# Patient Record
(demographics unavailable — no encounter records)

---

## 2024-10-09 NOTE — PHYSICAL EXAM
[FreeTextEntry1] : The patient is alert and oriented x3, naming intact x3, repetition normal, follows three-step commands, and is able to participate fully in the history taking. Speech is normal with no evidence of dysarthria. Memory is intact: Immediate recall 3 out of 3, short-term 3 out of 3, remote memory intact Cranial nerves II through XII intact Motor exam: Upper and lower extremities 5 out of 5 power, normal tone. No abnormal movements noted. Sensory exam: Intact to light touch and pinprick. Romberg negative. Coordination and vestibular exam: Finger to nose intact, no evidence of truncal or appendicular ataxia. No evidence of nystagmus. No vestibular symptoms elicited with head turning during ambulation. Gait: Normal stance and gait. No evidence of loss of balance Reflexes: One to 2+ in upper and lower extremities. No pathological reflexes. Downgoing toes.

## 2024-10-09 NOTE — HISTORY OF PRESENT ILLNESS
[FreeTextEntry1] : Interval History 10/9/24:  Since last visit, patient reports feeling off balance and not walking right and was later admitted to [facility] right thalamic stroke 2/2 ICAD. She was in the hospital for 4 days and then was in rehab for 3 weeks. She has continued to get therapy at home. She also saw a neurosurgeon, Dr. Newberry who recommends a repeat MRI w/wo in 6 months. Discharge medications including DAPT x90 days and Atorvastatin 80 mg. She reports frequent UTIs for which she was given vaginal estrogen cream. She reports no falls and has been using a cane to walk. She has been getting PT at home as well. She feels like her walking and handwriting is better post stroke. She is in the process of applying for [employer] and has been using home health aids to assist with laundry, etc.  Hospital Records: Discharge lab work with LDL 97, PRU 89 and A1c 7.3%. EF 56-60%, LA normal HCT: No visible acute intracranial findings. Small chronic occipital PCA territory infarct. Mild chronic microvascular disease. Possible intraosseous epidermoid or possibly dermoid cyst.  MRI Brain: Acute lacunar infarct in the right thalamus extending into the subthalamic area. Chronic hemorrhagic infarct in the left occipital PCA territory. Chronic lacunar infarct in the left frontal internal watershed involving the corona radiata. Calcific atherosclerotic disease of the proximal intradural left vertebral artery. Punctate chronic hemorrhage in the left dorsal cerebellum.  MRA H/N: There is focal slightly ectatic appearance of the left paraophthalmic ICA, possibly on the basis of a vessel infundibulum or atherosclerotic disease. There is focal severe stenosis of the right PCA P2 segment, presumably atherosclerotic in origin. The proximal anterior, middle and posterior cerebral arteries are otherwise patent bilaterally. No evidence of vascular occlusion, aneurysm or vascular malformation. Carotid US: No carotid stenosis bilaterally. ______________ Interval History 7/24/24:  Xanax this morning, only on Xanax PRN right sided head pain, comes and goes, slowly getting worse for the past 3 weeks, no known triggers, no tenderness to palpation  stopped Gabapentin but still taking Tegretol 400 mg BID trying Trazadone 200 mg daily at bedtime haven't been able to get out of the house for 2 months no SOB when walking to store Tylenol not helpful wants MRI to make sure nothing is wrong legs feel weak and body feels off, everything hurts saw Dr. Cooper (PCP) on Monday, EKG, chest x-ray and blood work negative taking off Atenolol did improve SOB laying in bed all day which makes her feel better ________ Interval History 4/10/24:  Since last visit, she reports that her trigeminal neuralgia is well controlled (Carbamazepine and Gabapentin PRN). She was seen by her psychiatrist yesterday who is transitioning her from Lexapro to Zoloft 25 mg. She took her first dose of Zoloft today and is scheduled to follow up with psych at the beginning of May. She is also slowly being weaned off her Ativan as well. She feels "off" since taking the Zoloft. Her legs continue to feel achey when walking which is consistent since last visit. She also stopped her Atenolol 3 weeks ago as well.   ________ Interval history: Patient's pain is much better with much less pain in the face. Takes carbamazepine standing and gabapentin prn and that seems to work well.  On lexapro 7.5 mg and ativan 1 mg. Still does not feel well.    Interval History 11/8/23: CC: Trigeminal Neuralgia  Since last visit, she continues to endorse similar trigeminal neuralgia pain compared to last visit. The pain is only on the left side and worsens at night. She is currently taking Carbamazepine  mg (2 tablets BID) and Gabapentin 100 mg, 1 tablet in the AM and 2 tablets at bedtime. She took 3 tablets at bedtime once and she felt like it made her too sleepy.  She is also concerned as she has had difficulty walking and breathing since her RSV vaccination 3 weeks ago. The symptoms occurred the day after she got the vaccine. Her primary doctor recommended that she have lab work taken to evaluate her GBS. She felt her symptoms improved last night and into this morning and have since returned. She feels off balance at times and needs to stop and catch her breath when walking. It is difficult for her to breathe deeply. She endorses getting intermittent headaches as well and difficulty with swallowing hot temperature foods. She is currently on antibiotics for an UTI since Friday. Laying down makes her feel better. No prior vaccination reactions. No fever or night sweats. No known sick contacts. She is scheduled to see her PCP, Dr. Cooper on Monday.    Confirmed that she takes Ativan 1 mg, 3 times per day as needed.  ____________________ Interval history 9/5/23:   She continues to report worsening TN symptoms at night. She would like her medications adjusted to improve her pain. She would like her most recent imaging reviewed from [place] as she was told there were some abnormalities.   _____________________ CC: Trigeminal neuralgia follow up   Interval hx 8/11/23: Since last visit, patient c/o increased pain with her daily Carbamazepine. She has trailed taking a 3rd pill every other day which didn't' show improvement as well as Lacosamide 50mg once daily with no improvement.   ________ had a flare up of her trigeminal pain in the last week. She is very stressed at the situation with her wife and her dementia and her being in the nursing home. She is taking clonazepam bid and that is not helping. in addition she is on tapering doses of Lexapro.  We went up last week to 2 tablets carbamazepine twice a day and it did not work. we then tried three tablets BID and patient felt dizzy and sleepy so she has gone back to 2 twice a day and feels at baseline but is having a lot of pain still.  Pain is worse when she is lying down.

## 2024-10-09 NOTE — ASSESSMENT
[FreeTextEntry1] : Plan: -Continue DAPT x90 days then Plavix monotherapy for secondary stroke prevention -Transition to Rosuvastatin 40 mg for LDL goal <70 -Continue aggressive vascular risk factor control with PCP, BP goal <130/80 -F/u with [facility] for cyst surveillance as was seen on previously MRI -Okay to use vaginal cream to hopefully decrease UTI frequency; will check estrogen level after using cream for 1 week -Start Zoloft 25 mg for mood with plan to increase at f/u -Continue Tegretol 400 mg BID for TN -Counselled on healthy eating (DASH/Mediterranean diet, limiting red meats and fried foods) -Counselled on importance of regular exercise and remaining active -Counselled on f/u with PCP regarding regular health maintenance and prevention, including routine screening -Counselled on signs of stroke BEFAST and to call 911 with any new or worsening neurological symptoms -RTO in 1 month

## 2024-10-11 NOTE — PHYSICAL EXAM
[Alert] : alert [No Acute Distress] : no acute distress [EOMI] : extra ocular movement intact [Normal Hearing] : hearing was normal [No Respiratory Distress] : no respiratory distress [No Accessory Muscle Use] : no accessory muscle use [Normal Rate and Effort] : normal respiratory rate and effort [Not Distended] : not distended [Spine Straight] : spine straight [No Stigmata of Cushings Syndrome] : no stigmata of Cushings Syndrome [Oriented x3] : oriented to person, place, and time [Normal Insight/Judgement] : insight and judgment were intact [de-identified] : Slowed gait, ambulating with cane

## 2024-10-11 NOTE — PHYSICAL EXAM
[Alert] : alert [No Acute Distress] : no acute distress [EOMI] : extra ocular movement intact [Normal Hearing] : hearing was normal [No Respiratory Distress] : no respiratory distress [No Accessory Muscle Use] : no accessory muscle use [Normal Rate and Effort] : normal respiratory rate and effort [Not Distended] : not distended [Spine Straight] : spine straight [No Stigmata of Cushings Syndrome] : no stigmata of Cushings Syndrome [Oriented x3] : oriented to person, place, and time [Normal Insight/Judgement] : insight and judgment were intact [de-identified] : Slowed gait, ambulating with cane

## 2024-10-11 NOTE — ASSESSMENT
[FreeTextEntry1] : Type 2 DM, not well controlled w/complications including CAD Most recent A1c 7.7% (Sept 4, 2024) Current regimen: Mounjaro 7.5mg weekly  Presents for CGM education. She was initially apprehensive about CGM reader use, finding it overwhelming especially in light of recent CVA and her recent health issues and thinking she may take it off immediately. After discussing what the day to day use of the CGM/reader may look like (having ready access to glucose readings via scan), she seemed to warm up to it and agreed to try it for a couple days to see how it would go.  Plan -- We reviewed starting CGM - Freestyle Hernandez 2 sensor/reader. Sensor was applied to left arm, and Petty was able to successfully pair the sensor with the reader. -- Discussed goal BG ranges, glucose alarms, meaning of trend arrows, and importance of confirming BG with fingerstick if hypoglycemic or not matching symptoms. -- Reviewed hypoglycemia protocol. -- Continue current Diabetes medication regimen -- will discuss home BG readings with Dr. Prescott. -- Reminded her to bring reader to future appointments for review of glucose data. -- She can contact the office with any questions. -- Advised to call neurologist (saw yesterday) given her symptoms of feeling a little unsteady on her feet today. -- Follow up as planned with Dr. Prescott.

## 2024-10-11 NOTE — ADDENDUM
[FreeTextEntry1] : 10/11/24, MS: Called Petty after speaking with Dr. Prescott about her FBS, which were generally 140-190. Plan to increase to Mounjaro 10mg once a week. Petty is in agreement with plan. Prescription updated.

## 2024-10-11 NOTE — PHYSICAL EXAM
[Alert] : alert [No Acute Distress] : no acute distress [EOMI] : extra ocular movement intact [Normal Hearing] : hearing was normal [No Respiratory Distress] : no respiratory distress [No Accessory Muscle Use] : no accessory muscle use [Normal Rate and Effort] : normal respiratory rate and effort [Not Distended] : not distended [Spine Straight] : spine straight [No Stigmata of Cushings Syndrome] : no stigmata of Cushings Syndrome [Oriented x3] : oriented to person, place, and time [Normal Insight/Judgement] : insight and judgment were intact [de-identified] : Slowed gait, ambulating with cane

## 2024-10-11 NOTE — PHYSICAL EXAM
[Alert] : alert [No Acute Distress] : no acute distress [EOMI] : extra ocular movement intact [Normal Hearing] : hearing was normal [No Respiratory Distress] : no respiratory distress [No Accessory Muscle Use] : no accessory muscle use [Normal Rate and Effort] : normal respiratory rate and effort [Not Distended] : not distended [Spine Straight] : spine straight [No Stigmata of Cushings Syndrome] : no stigmata of Cushings Syndrome [Oriented x3] : oriented to person, place, and time [Normal Insight/Judgement] : insight and judgment were intact [de-identified] : Slowed gait, ambulating with cane

## 2024-10-11 NOTE — HISTORY OF PRESENT ILLNESS
[FreeTextEntry1] : Petty Chowdhury is an 86 year old female who presents for DM follow up/CGM education.  Patient of Dr. Prescott, last seen September 4, 2024  PMHx: ASCVD, obesity, HTN, COPD, HLD, follicular lymphoma, CHRIS, sarcoma Allergies: Dilaudid, metformin  Presents with all supplies -- purchased and paid in cash as insurance did not cover Feeing apprehensive about starting CGM use. Says she is feeling a little unsteady on her feet today. She had a CVA in August and saw her neurologist yesterday.  FS in office: 127  Current DM regimen: Mounjaro 7.5mg weekly Brought a recent log with fasting blood glucose levels; typically 140-190, with occasional reading in 200s (though none >250) She is worried about these glucose readings and is hoping to speak with Dr. Prescott about them further. A1c in September was 7.7%.  Education session regarding the initiation of use of Freestyle Hernandez 2 continuous glucose monitor was conducted.  We reviewed technique for sensor self-application including steps of hand hygiene, cleaning skin with alcohol pad, appropriate site of application, sensor application according to the directions, and pairing the sensor with the reader/phone application.  We also reviewed general concepts of CGM monitoring such as trends, arrows indicating glucose rising/falling, and when to check with fingerstick (when number does not match how they are feeling and in case of hypoglycemia).  We also discussed CGM goals such as Time in Range (BG- 70-180mg/dL) >70% of the time, Time Below Range (<70 mg/dL <4% of time, <54mg/dL <1% of time), Time Above Range (181-250mg/dL <25%, and >251mg/dL <5%), as well as goal fasting BS/PPBS.  We reviewed the high and low glucose alarms.  Discussed hypoglycemia protocol and Rule of 15.

## 2024-11-13 NOTE — HISTORY OF PRESENT ILLNESS
[FreeTextEntry1] : Interval History 11/13/24:  Since last visit, patient no new stroke-like symptoms. She is tolerating her Aspirin, Plavix and Rosuvastatin without bleeding, bruising or myalgias. BP today 118/75, SBPs in 120-140s at home with new blood pressure machine. She reports still not feeling 100% but has loved going to PT twice per week. She still feels wobbly on the left side. She is not taking her Xanax as often. She feels like her mood is stable. She is using Gabapentin 300 mg to help her sleep at night since Trazadone was not beneficial. She denies any TN symptoms. She has been using [facility] to get to her appointments. She reports no recent blood work. She is concerned about hair loss with her cholesterol medication.   Interval History 10/9/24:  Since last visit, patient reports feeling off balance and not walking right and was later admitted to [facility] right thalamic stroke 2/2 ICA. She was in the hospital for 4 days and then was in rehab for 3 weeks. She has continued to get therapy at home. She also saw a neurosurgeon, Dr. Newberry who recommends a repeat MRI w/wo in 6 months. Discharge medications including DAPT x90 days and Atorvastatin 80 mg. She reports frequent UTIs for which she was given vaginal estrogen cream. She reports no falls and has been using a cane to walk. She has been getting PT at home as well. She feels like her walking and handwriting is better post stroke. She is in the process of applying for [employer] and has been using home health aids to assist with laundry, etc.  Hospital Records: Discharge lab work with LDL 97, PRU 89 and A1c 7.3%. EF 56-60%, LA normal HCT: No visible acute intracranial findings. Small chronic occipital PCA territory infarct. Mild chronic microvascular disease. Possible intraosseous epidermoid or possibly dermoid cyst.  MRI Brain: Acute lacunar infarct in the right thalamus extending into the subthalamic area. Chronic hemorrhagic infarct in the left occipital PCA territory. Chronic lacunar infarct in the left frontal internal watershed involving the corona radiata. Calcific atherosclerotic disease of the proximal intradural left vertebral artery. Punctate chronic hemorrhage in the left dorsal cerebellum.  MRA H/N: There is focal slightly ectatic appearance of the left paraophthalmic ICA, possibly on the basis of a vessel infundibulum or atherosclerotic disease. There is focal severe stenosis of the right PCA P2 segment, presumably atherosclerotic in origin. The proximal anterior, middle and posterior cerebral arteries are otherwise patent bilaterally. No evidence of vascular occlusion, aneurysm or vascular malformation. Carotid US: No carotid stenosis bilaterally. ______________ Interval History 7/24/24:  Xanax this morning, only on Xanax PRN right sided head pain, comes and goes, slowly getting worse for the past 3 weeks, no known triggers, no tenderness to palpation  stopped Gabapentin but still taking Tegretol 400 mg BID trying Trazadone 200 mg daily at bedtime haven't been able to get out of the house for 2 months no SOB when walking to store Tylenol not helpful wants MRI to make sure nothing is wrong legs feel weak and body feels off, everything hurts saw Dr. Cooper (PCP) on Monday, EKG, chest x-ray and blood work negative taking off Atenolol did improve SOB laying in bed all day which makes her feel better ________ Interval History 4/10/24:  Since last visit, she reports that her trigeminal neuralgia is well controlled (Carbamazepine and Gabapentin PRN). She was seen by her psychiatrist yesterday who is transitioning her from Lexapro to Zoloft 25 mg. She took her first dose of Zoloft today and is scheduled to follow up with psych at the beginning of May. She is also slowly being weaned off her Ativan as well. She feels "off" since taking the Zoloft. Her legs continue to feel achey when walking which is consistent since last visit. She also stopped her Atenolol 3 weeks ago as well.   ________ Interval history: Patient's pain is much better with much less pain in the face. Takes carbamazepine standing and gabapentin prn and that seems to work well.  On lexapro 7.5 mg and ativan 1 mg. Still does not feel well.    Interval History 11/8/23: CC: Trigeminal Neuralgia  Since last visit, she continues to endorse similar trigeminal neuralgia pain compared to last visit. The pain is only on the left side and worsens at night. She is currently taking Carbamazepine  mg (2 tablets BID) and Gabapentin 100 mg, 1 tablet in the AM and 2 tablets at bedtime. She took 3 tablets at bedtime once and she felt like it made her too sleepy.  She is also concerned as she has had difficulty walking and breathing since her RSV vaccination 3 weeks ago. The symptoms occurred the day after she got the vaccine. Her primary doctor recommended that she have lab work taken to evaluate her GBS. She felt her symptoms improved last night and into this morning and have since returned. She feels off balance at times and needs to stop and catch her breath when walking. It is difficult for her to breathe deeply. She endorses getting intermittent headaches as well and difficulty with swallowing hot temperature foods. She is currently on antibiotics for an UTI since Friday. Laying down makes her feel better. No prior vaccination reactions. No fever or night sweats. No known sick contacts. She is scheduled to see her PCP, Dr. Cooper on Monday.    Confirmed that she takes Ativan 1 mg, 3 times per day as needed.  ____________________ Interval history 9/5/23:   She continues to report worsening TN symptoms at night. She would like her medications adjusted to improve her pain. She would like her most recent imaging reviewed from [place] as she was told there were some abnormalities.   _____________________ CC: Trigeminal neuralgia follow up   Interval hx 8/11/23: Since last visit, patient c/o increased pain with her daily Carbamazepine. She has trailed taking a 3rd pill every other day which didn't' show improvement as well as Lacosamide 50mg once daily with no improvement.   ________ had a flare up of her trigeminal pain in the last week. She is very stressed at the situation with her wife and her dementia and her being in the nursing home. She is taking clonazepam bid and that is not helping. in addition she is on tapering doses of Lexapro.  We went up last week to 2 tablets carbamazepine twice a day and it did not work. we then tried three tablets BID and patient felt dizzy and sleepy so she has gone back to 2 twice a day and feels at baseline but is having a lot of pain still.  Pain is worse when she is lying down.

## 2024-11-13 NOTE — ASSESSMENT
[FreeTextEntry1] : Plan: -Compared MRI from 2023 to 2024; epidermal cyst stable without edema - encouraged to follow up NSGY for routine checks -Continue Plavix and Rosuvastatin for secondary stroke prevention; stop Aspirin  -If hair loss worsens with Rosuvastatin then will referral to Dr. Restrepo for cholesterol management -Continue aggressive vascular risk factor control with PCP, BP goal <130/80 and LDL goal <50 -Increase Zoloft to 50 mg for mood management -Encouraged increased socialization on the weekends at [facility] -Continue Tegretol 400 mg BID for TN -Continue Gabapentin 300 mg daily at bedtime for sleep assistance; emphasized sleep hygiene practices -Continue PT as tolerated -Stroke labs today including PRU, CMP, lipid profile and Lipoprotein A -Counselled on healthy eating (DASH/Mediterranean diet, limiting red meats and fried foods) -Counselled on importance of regular exercise and remaining active -Counselled on f/u with PCP regarding regular health maintenance and prevention, including routine screening -Counselled on signs of stroke BEFAST and to call 911 with any new or worsening neurological symptoms -Plan of care discussed with Dr. Cooper (PCP) via telephone call -RTO in 3 months

## 2024-12-31 NOTE — ASSESSMENT
[FreeTextEntry1] : 1) DM2: now unontrolled, A1C on 5/2024 above target, now at 7.7% Natural hx of the disease and importance of treatment targets discussed at length, she verbalized understanding. ADA diet and importance of exercise discussed at length. Plan is to stop trulicity and switch GLP-1 agonist today to mounjaro 7.5 mg weekly. Refer to Nutritionist today. We sarika check microalbumin, lipids and labs on the NV. Discuss vaccines and podiatry/opthalmology referrals on NV (eye exam referral provided today. we will consider SGLT-2 inh initiation on the NV if improved diet does not reduce A1C. advised on importance of FSG monitoring post chemotherapy and to contact us if hyperglycemia occurs.    2) Weight gain: complicated by DM2. Discussed medical strategies. Pt would like to try lifestyle modifications and GLP-1 agonist therapy at this time. Reassess on the NV for at least ~5% TBW loss. r/o pituitary dysfunction as cause of abnormal weight gain.    3) Essential HTN: Pt is at goal BP and on an ACE inh. Reassess microalbumin prior to the NV.    4) Dyslipidemia: Pt is not on a moderate intensity statin. Start Atorvastatin 40 mg QDaily after we REassess lipids on the next visit. LDL target <100. [Carbohydrate Consistent Diet] : carbohydrate consistent diet [Long Term Vascular Complications] : long term vascular complications of diabetes [Importance of Diet and Exercise] : importance of diet and exercise to improve glycemic control, achieve weight loss and improve cardiovascular health [Self Monitoring of Blood Glucose] : self monitoring of blood glucose [Incretin Mimetic Therapy] : Risks and benefits of incretin mimetic therapy were discussed with the patient including nauseau, pancreatitis and potential risk of medullary thyroid cancer

## 2024-12-31 NOTE — HISTORY OF PRESENT ILLNESS
[FreeTextEntry1] : here for f/u evaluation and management of DM2 generally feels well and endorses no acute complaints. reports diagnosis ~20+ y/a. no past hospitalizations. no known micro/macrovascular complications. long standing use of januvia. does not recall other meds. reports recent change in appetite, eating more carbs recently. weight gain has occurred. no med changes other than drop on januvia to 50 mg PO QD.  12/2024 : Here for /fu, generally feels well and endorses no acute complaints. no interval hosp.  reports no nausea or vomiting. not on steroids at this time. Has not been monitoring FSG. Today reports full tolerance of GLP-1 agonist at 5 mg dose as prescribed by FABRIZIO GOSS, denies any GI s/e. appetite control  has improved and weight loss has resumed, FSG have improved at home as well, consistently <140, no hypoglycemia.  She otherwise denies any f/c, CP, SOB, palpitations, tremors, depressed mood, anxiety, palpitations, n/v, stool/urinary abn, skin/weight changes, heat/cold intolerance, HAs, breast/nipple changes, polyuria/polydipsia/nocturia or other complaints. Reports recent metformin exposure, not tolerated 2/2 palpitations and heartburn.

## 2025-01-07 NOTE — ASSESSMENT
[FreeTextEntry1] : 85 y/o female with PMHx of HTN, CAD (x 2 stents), DM2, lung CA s/p left upper lobectomy in 2008 at [facility] (no chemo/radiation), right thalamic stroke 2/2 ICAD 2024, hx of sarcoma of stomach s/p resection 2023? 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  On imaging review, right parietal interosseous mass increased in size on August 2024 MRI compared to 2014 imaging, but grossly stable from 2022 imaging.  Without complaints for today's visit.   Will re-image with repeat MRI Brain @ 6 months (Feb 2025) with apt with Dr. D'Amico after MRI for review.   A total of 10 minutes was spent reviewing the patient's history, any available imaging, and verbal examination of the patient. The patient's questions were answered.  The patient demonstrated an excellent understanding of todays discussion and next steps in treatment plan.

## 2025-01-07 NOTE — REASON FOR VISIT
[New Patient Visit] : a new patient visit [Home] : at home, [unfilled] , at the time of the visit. [Medical Office: ([facility])___] : at the medical office located in  [Verbal consent obtained from patient] : the patient, [unfilled]

## 2025-01-07 NOTE — HISTORY OF PRESENT ILLNESS
[de-identified] : 87 y/o female with PMHx of HTN, CAD (x 2 stents), DM2, lung CA s/p left upper lobectomy in 2008 at [facility] (no chemo/radiation), right thalamic stroke 2/2 ICAD 2024, hx of sarcoma of stomach s/p resection 2023? 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   Pt presents today to establish neurosurgical care, referred by neurology NP Shereen Diaz. - She was previously following with intermittent imaging of the brain to follow right parietal interosseous mass. Patient unsure why she had MRI done in 2014 (@ [facility]) that dx lesion. Per note/ record review, was for diplopia workup.  - 2/6/23 MRI brain w/wo report with right parietal intramedullary bone mass, sub-centimeter anterior right temporal dural based enhancing focus, may represent a tiny meningioma.  - Repeat MRI brain done 8/1/23 @ [facility]. Pt believes was done during melanoma imaging workup but not entirely sure. MRI showed right parietal interosseous mass and lesion of left occipital pole and left cerebellum.  - Most recently had August MRI brain done after stroke at [facility].  - Without any complaints for today's visit. Denies headaches, dizziness, facial pain, weakness.   Neurology: Shereen Calderon  Endo: Grady Prescott

## 2025-01-30 NOTE — HISTORY OF PRESENT ILLNESS
[FreeTextEntry1] : 2025 -- 86 F  with hx recurrent UTIs. Patient doing well with estrogen cream. She has not had any recent UTIs. Denies gross hematuria or dysuria. Urinary leakage resolved as per pt. Reports rare urinary incontinence every 3 months when "standing at the sink [doing dishes]". She wears pads for protection- mostly dry.     24-- 86 F  with hx recurrent UTIs. She had a recent stroke and was in rehab. Patient reports that her urinary leakage at night has resolved after the last course of abx she took. She presents today for urodynamics.   2024 -- 86 F  with hx recurrent UTIs. Reports bothersome urinary leakage. Patient verbalizes that she had a recent stroke and was in rehab- just discharged 4 days ago. Reports that she was treated with 7-day keflex by Kenny for recent UTI while in rehab. Her dysuria improved however her leakage is still bothersome.  2024 -- Pt is 84 yo F  with hx recurrent UTIs being treated by Dr. Cooper with oral abx (she has done well with cipro in the past). She reports 3-4 UTIs over the past year. She has not had any recent UTIs in the past 4 months. Denies gross hematuria or dysuria. Pt unsure of what triggers her UTIs.  She has some urinary dribbling once a month. She wears pads when she is going out. Denies sensation of incomplete emptying and prolapse. Denies PMH and FHx renal stones. PSH hysterectomy "long time ago" for ?pre-ca with no radiation/chemotherapy as per pt.  Of note, pt does not prefer additional procedures/tests. She has done well with cipro in the past.  Udip: 2024 ---small blood PVR: 5cc (done to rule out incomplete bladder emptying)  PMH: anxiety/depression, DM, HTN PSH: hysterectomy, cholecystectomy, **attached intake FH: no  malignancies SocHx: non-smoker, no alcohol Meds: Losartan, Amlodipine, Atenolol, Trulicity, Lexapro, Gabapentin Allergies: NKDA

## 2025-01-30 NOTE — ADDENDUM
[FreeTextEntry1] : A portion of this note was written by [Alejandro Zaman] on 09/19/2024 acting as a scribe for Dr. Cohen.   I have personally reviewed the chart and agree that the record accurately reflects my personal performance of the history, physical exam, assessment, and plan.

## 2025-01-30 NOTE — HISTORY OF PRESENT ILLNESS
[FreeTextEntry1] : 2025 -- 86 F  with hx recurrent UTIs. Patient doing well with estrogen cream. She has not had any recent UTIs. Denies gross hematuria or dysuria. Urinary leakage resolved as per pt. Reports rare urinary incontinence every 3 months when "standing at the sink [doing dishes]". She wears pads for protection- mostly dry.     24-- 86 F  with hx recurrent UTIs. She had a recent stroke and was in rehab. Patient reports that her urinary leakage at night has resolved after the last course of abx she took. She presents today for urodynamics.   2024 -- 86 F  with hx recurrent UTIs. Reports bothersome urinary leakage. Patient verbalizes that she had a recent stroke and was in rehab- just discharged 4 days ago. Reports that she was treated with 7-day keflex by Kenny for recent UTI while in rehab. Her dysuria improved however her leakage is still bothersome.  2024 -- Pt is 86 yo F  with hx recurrent UTIs being treated by Dr. Cooper with oral abx (she has done well with cipro in the past). She reports 3-4 UTIs over the past year. She has not had any recent UTIs in the past 4 months. Denies gross hematuria or dysuria. Pt unsure of what triggers her UTIs.  She has some urinary dribbling once a month. She wears pads when she is going out. Denies sensation of incomplete emptying and prolapse. Denies PMH and FHx renal stones. PSH hysterectomy "long time ago" for ?pre-ca with no radiation/chemotherapy as per pt.  Of note, pt does not prefer additional procedures/tests. She has done well with cipro in the past.  Udip: 2024 ---small blood PVR: 5cc (done to rule out incomplete bladder emptying)  PMH: anxiety/depression, DM, HTN PSH: hysterectomy, cholecystectomy, **attached intake FH: no  malignancies SocHx: non-smoker, no alcohol Meds: Losartan, Amlodipine, Atenolol, Trulicity, Lexapro, Gabapentin Allergies: NKDA

## 2025-01-30 NOTE — ASSESSMENT
[FreeTextEntry1] : I discussed the findings and options with Ms. EUGENIO CHAMBERLAIN in detail.   Ms. CHAMBERLAIN reports to be well managed with no new urinary complaints.  - We agreed that she will consistently continue estrogen cream to help decrease her risk of UTIs.   Pt will plan to return in 1 year for annual f/u, or sooner if needed. Patient expressed understanding.   The total amount of time I have personally spent preparing for this visit, reviewing the patient's test results, obtaining external history, ordering tests/medications, documenting clinical information, communicating with and counseling the patient/family and/or caregiver(s), reviewing old records, and spent face to face with the patient explaining the above was 35 minutes.

## 2025-02-10 NOTE — REASON FOR VISIT
[FreeTextEntry1] : 85 y/o female with PMHx of HTN, CAD s/p PCI x 2, DM2, lung CA s/p left upper lobectomy in 2008 at [facility] (no chemo/radiation), hx of sarcoma of stomach s/p resection (no chemo/RT), hx of intracranial lesions in temporal, occipital and cerebellum here for lipid management following right thalamic stroke 2/2 ICAD 2024.  Since stroke, has not been doing much, does not walk or exercise since weather has been cold. Previously able to take roller walker out shopping, no symptoms. Tried rosuvastatin and atorvastatin, had significant bilateral thigh pains that went away with stopping. Currently only on ezetimibe. Has not had much appetite since and she feels in general overwhelmed and tired.

## 2025-02-10 NOTE — ASSESSMENT
[FreeTextEntry1] : 87 y/o female with PMHx of HTN, CAD s/p PCI x 2, DM2, lung CA s/p left upper lobectomy in 2008 at [facility] (no chemo/radiation), hx of sarcoma of stomach s/p resection (no chemo/RT), hx of intracranial lesions in temporal, occipital and cerebellum here for lipid management following right thalamic stroke 2/2 ICAD 2024. Has been unable to tolerate multiple statins (atorvastatin and rosuvastatin) due to significant myalgias. Significant risk factors and LDL not at goal on ezetimibe, significantly elevated Lpa 196.  #CVA, CAD, HLD: - Reviewed risks and benefits of lipid lowering options, patient unable to tolerate statin - Recommended PCSK9 inhibitor, discussed side effects and method of injection - Start repatha 140 q 2 weeks - Continue ezetimibe  - Continue Plavix per Neuro  #DM2: A1c 6.7, continue Mounjaro  #HTN: BP not at goal, patient states anxious today, worried about catching ride home. BP at prior visits intermittently elevated.  - Continue amlodipine and losartan

## 2025-02-10 NOTE — REASON FOR VISIT
[FreeTextEntry1] : 87 y/o female with PMHx of HTN, CAD s/p PCI x 2, DM2, lung CA s/p left upper lobectomy in 2008 at [facility] (no chemo/radiation), hx of sarcoma of stomach s/p resection (no chemo/RT), hx of intracranial lesions in temporal, occipital and cerebellum here for lipid management following right thalamic stroke 2/2 ICAD 2024.  Since stroke, has not been doing much, does not walk or exercise since weather has been cold. Previously able to take roller walker out shopping, no symptoms. Tried rosuvastatin and atorvastatin, had significant bilateral thigh pains that went away with stopping. Currently only on ezetimibe. Has not had much appetite since and she feels in general overwhelmed and tired.

## 2025-02-10 NOTE — ASSESSMENT
[FreeTextEntry1] : 85 y/o female with PMHx of HTN, CAD s/p PCI x 2, DM2, lung CA s/p left upper lobectomy in 2008 at [facility] (no chemo/radiation), hx of sarcoma of stomach s/p resection (no chemo/RT), hx of intracranial lesions in temporal, occipital and cerebellum here for lipid management following right thalamic stroke 2/2 ICAD 2024. Has been unable to tolerate multiple statins (atorvastatin and rosuvastatin) due to significant myalgias. Significant risk factors and LDL not at goal on ezetimibe, significantly elevated Lpa 196.  #CVA, CAD, HLD: - Reviewed risks and benefits of lipid lowering options, patient unable to tolerate statin - Recommended PCSK9 inhibitor, discussed side effects and method of injection - Start repatha 140 q 2 weeks - Continue ezetimibe  - Continue Plavix per Neuro  #DM2: A1c 6.7, continue Mounjaro  #HTN: BP not at goal, patient states anxious today, worried about catching ride home. BP at prior visits intermittently elevated.  - Continue amlodipine and losartan

## 2025-02-27 NOTE — HISTORY OF PRESENT ILLNESS
[de-identified] : 85 y/o female with PMHx of HTN, CAD (x 2 stents), DM2, lung CA s/p left upper lobectomy in 2008 at [facility] (no chemo/radiation), right thalamic stroke 2/2 ICAD 2024, hx of sarcoma of stomach s/p resection 2023? 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   1/7/25 pt presented to NP to establish neurosurgical care, referred by neurology NP Shereen Diaz. Without complaints.  Most recent MRI brain done August 2024 after stroke at [facility]; showed that right parietal interosseous mass increased in size compared to 2014 imaging, but grossly stable from 2022 imaging; indeterminate susceptibility of left occipital pole and left cerebellum, not metastatic.  Recommended 6 month repeat imaging.   Presents today for MRI review, repeated MRI today.  She denies headaches, weakness.  Continues f/u with neurologist Dr. Freeman. On tegretaol for trigeminal neuralgia.  Does note some daily fatigue.   Neurology: Shereen Calderon Endo: Grady Prescott PCP: Jorge Alberto Cooper

## 2025-02-27 NOTE — REASON FOR VISIT
[New Patient Visit] : a new patient visit [FreeTextEntry1] : Hx of right parietal interosseous mass,  left occipital pole and left cerebellum lesions. MRI review.

## 2025-02-27 NOTE — ASSESSMENT
[FreeTextEntry1] : This patient presented with a right parietal intraosseous mass, incidentally discovered on imaging obtained for evaluation of trigeminal neuralgia.  She also has other brain lesions in the left occipital pole and left cerebellum likely represented venous varices.  She has a complex medical history including hypertension, common iliac artery disease with stents, type 2 diabetes, lung cancer, gastric sarcoma, melanoma of the left chest, and a previous right thalamic stroke. Currently, she is asymptomatic from the brain lesions, and her other medical conditions are stable.  She reports that the palpable bump associated with the intraosseous lesion has decreased in size. Comparison of brain MRIs from 2022 and 2024 shows no change in the size of the mass.  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 Given the stability of the lesion and her asymptomatic status, a conservative approach with annual MRI surveillance was recommended.   Dr. D'Amico independently reviewed all available images with patient.   PLAN: - RTC one year with follow- up MRI    Patient verbalizes understanding of today's discussion and next steps in treatment plan.  Today, my ACP, Tennille Brown, was here to observe my evaluation and management services for this patient to be followed going forward.    A total of 25 minutes was spent reviewing the labs, imaging and physical examination of the patient. We discussed the diagnosis, and the plan. The patient's questions were answered. The patient demonstrated an excellent understanding of the plan.

## 2025-02-27 NOTE — HISTORY OF PRESENT ILLNESS
[de-identified] : 85 y/o female with PMHx of HTN, CAD (x 2 stents), DM2, lung CA s/p left upper lobectomy in 2008 at [facility] (no chemo/radiation), right thalamic stroke 2/2 ICAD 2024, hx of sarcoma of stomach s/p resection 2023? 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   1/7/25 pt presented to NP to establish neurosurgical care, referred by neurology NP Shereen Diaz. Without complaints.  Most recent MRI brain done August 2024 after stroke at [facility]; showed that right parietal interosseous mass increased in size compared to 2014 imaging, but grossly stable from 2022 imaging; indeterminate susceptibility of left occipital pole and left cerebellum, not metastatic.  Recommended 6 month repeat imaging.   Presents today for MRI review, repeated MRI today.  She denies headaches, weakness.  Continues f/u with neurologist Dr. Freeman. On tegretaol for trigeminal neuralgia.  Does note some daily fatigue.   Neurology: Shereen Calderon Endo: Grady Prescott PCP: Jorge Alberto Cooper

## 2025-02-27 NOTE — PHYSICAL EXAM
[General Appearance - Alert] : alert [General Appearance - In No Acute Distress] : in no acute distress [Oriented To Time, Place, And Person] : oriented to person, place, and time [Impaired Insight] : insight and judgment were intact [Affect] : the affect was normal [Memory Recent] : recent memory was not impaired [Sclera] : the sclera and conjunctiva were normal [Neck Appearance] : the appearance of the neck was normal [] : no respiratory distress [Respiration, Rhythm And Depth] : normal respiratory rhythm and effort [Skin Color & Pigmentation] : normal skin color and pigmentation [FreeTextEntry5] : CN II-XII grossly intact  [FreeTextEntry1] : Steady gait with assistive cane

## 2025-03-14 NOTE — ASSESSMENT
[FreeTextEntry1] : Plan: -Continue Plavix and Zetia for secondary stroke prevention -Continue yearly f/u with Dr. D'Amico for epidermal cyst surveillance -Call pharmacy to fill Repatha; confirmed prescription is ready once patient calls  -Continue aggressive vascular risk factor control with PCP, BP goal <130/80 and LDL goal <50 -Continue Sertraline 50 mg for mood management; emphasized increased socialization -Continue Tegretol 400 mg BID for TN -Continue Gabapentin 300 mg daily at bedtime for sleep assistance; emphasized sleep hygiene practices -Continue PT as tolerated; new referral faxed to [phone] at [facility] per patient request -Counselled on healthy eating (DASH/Mediterranean diet, limiting red meats and fried foods) -Counselled on importance of regular exercise and remaining active -Counselled on f/u with PCP regarding regular health maintenance and prevention, including routine screening -Counselled on signs of stroke BEFAST and to call 911 with any new or worsening neurological symptoms -RTO in 3 months to check in

## 2025-03-14 NOTE — ASSESSMENT
[FreeTextEntry1] : Plan: -Continue Plavix and Zetia for secondary stroke prevention -Continue yearly f/u with Dr. D'Amico for epidermal cyst surveillance -Call pharmacy to fill Repatha; confirmed prescription is ready once patient calls  -Continue aggressive vascular risk factor control with PCP, BP goal <130/80 and LDL goal <50 -Continue Sertraline 50 mg for mood management; emphasized increased socialization -Continue Tegretol 400 mg BID for TN -Continue Gabapentin 300 mg daily at bedtime for sleep assistance; emphasized sleep hygiene practices -Continue PT as tolerated; new referral faxed to [phone] at [facility] per patient request -Counselled on healthy eating (DASH/Mediterranean diet, limiting red meats and fried foods) -Counselled on importance of regular exercise and remaining active -Counselled on f/u with PCP regarding regular health maintenance and prevention, including routine screening -Counselled on signs of stroke BEFAST and to call 911 with any new or worsening neurological symptoms -RTO in 3 months to check in

## 2025-03-14 NOTE — HISTORY OF PRESENT ILLNESS
[FreeTextEntry1] : Interval History 3/12/25:  Since last visit, patient reports no new stroke-like symptoms. She is tolerating her Plavix and Zetia without bleeding, bruising or myalgias. She has not yet started Repatha due to pharmacy issues. She continues to report chronic SOB and imbalance issues. She did have a fall last month without head strike or LOC. She is restarting PT after taking a month off. She has been using a walker more often for longer distances. She reports getting a new glasses prescription and has not driven since August. She reports improvement in her mood and fatigue over the past few weeks.   Interval History 11/13/24:  Since last visit, patient no new stroke-like symptoms. She is tolerating her Aspirin, Plavix and Rosuvastatin without bleeding, bruising or myalgias. BP today 118/75, SBPs in 120-140s at home with new blood pressure machine. She reports still not feeling 100% but has loved going to PT twice per week. She still feels wobbly on the left side. She is not taking her Xanax as often. She feels like her mood is stable. She is using Gabapentin 300 mg to help her sleep at night since Trazadone was not beneficial. She denies any TN symptoms. She has been using [facility] to get to her appointments. She reports no recent blood work. She is concerned about hair loss with her cholesterol medication.   Interval History 10/9/24:  Since last visit, patient reports feeling off balance and not walking right and was later admitted to [facility] right thalamic stroke 2/2 [facility]. She was in the hospital for 4 days and then was in rehab for 3 weeks. She has continued to get therapy at home. She also saw a neurosurgeon, Dr. Newberry who recommends a repeat MRI w/wo in 6 months. Discharge medications including DAPT x90 days and Atorvastatin 80 mg. She reports frequent UTIs for which she was given vaginal estrogen cream. She reports no falls and has been using a cane to walk. She has been getting PT at home as well. She feels like her walking and handwriting is better post stroke. She is in the process of applying for [facility] and has been using home health aids to assist with laundry, etc.  Hospital Records: Discharge lab work with LDL 97, PRU 89 and A1c 7.3%. EF 56-60%, LA normal HCT: No visible acute intracranial findings. Small chronic occipital PCA territory infarct. Mild chronic microvascular disease. Possible intraosseous epidermoid or possibly dermoid cyst.  MRI Brain: Acute lacunar infarct in the right thalamus extending into the subthalamic area. Chronic hemorrhagic infarct in the left occipital PCA territory. Chronic lacunar infarct in the left frontal internal watershed involving the corona radiata. Calcific atherosclerotic disease of the proximal intradural left vertebral artery. Punctate chronic hemorrhage in the left dorsal cerebellum.  MRA H/N: There is focal slightly ectatic appearance of the left paraophthalmic ICA, possibly on the basis of a vessel infundibulum or atherosclerotic disease. There is focal severe stenosis of the right PCA P2 segment, presumably atherosclerotic in origin. The proximal anterior, middle and posterior cerebral arteries are otherwise patent bilaterally. No evidence of vascular occlusion, aneurysm or vascular malformation. Carotid US: No carotid stenosis bilaterally. ______________ Interval History 7/24/24:  Xanax this morning, only on Xanax PRN right sided head pain, comes and goes, slowly getting worse for the past 3 weeks, no known triggers, no tenderness to palpation  stopped Gabapentin but still taking Tegretol 400 mg BID trying Trazadone 200 mg daily at bedtime haven't been able to get out of the house for 2 months no SOB when walking to store Tylenol not helpful wants MRI to make sure nothing is wrong legs feel weak and body feels off, everything hurts saw Dr. Cooper (PCP) on Monday, EKG, chest x-ray and blood work negative taking off Atenolol did improve SOB laying in bed all day which makes her feel better ________ Interval History 4/10/24:  Since last visit, she reports that her trigeminal neuralgia is well controlled (Carbamazepine and Gabapentin PRN). She was seen by her psychiatrist yesterday who is transitioning her from Lexapro to Zoloft 25 mg. She took her first dose of Zoloft today and is scheduled to follow up with psych at the beginning of May. She is also slowly being weaned off her Ativan as well. She feels "off" since taking the Zoloft. Her legs continue to feel achey when walking which is consistent since last visit. She also stopped her Atenolol 3 weeks ago as well.   ________ Interval history: Patient's pain is much better with much less pain in the face. Takes carbamazepine standing and gabapentin prn and that seems to work well.  On lexapro 7.5 mg and ativan 1 mg. Still does not feel well.    Interval History 11/8/23: CC: Trigeminal Neuralgia  Since last visit, she continues to endorse similar trigeminal neuralgia pain compared to last visit. The pain is only on the left side and worsens at night. She is currently taking Carbamazepine  mg (2 tablets BID) and Gabapentin 100 mg, 1 tablet in the AM and 2 tablets at bedtime. She took 3 tablets at bedtime once and she felt like it made her too sleepy.  She is also concerned as she has had difficulty walking and breathing since her RSV vaccination 3 weeks ago. The symptoms occurred the day after she got the vaccine. Her primary doctor recommended that she have lab work taken to evaluate her GBS. She felt her symptoms improved last night and into this morning and have since returned. She feels off balance at times and needs to stop and catch her breath when walking. It is difficult for her to breathe deeply. She endorses getting intermittent headaches as well and difficulty with swallowing hot temperature foods. She is currently on antibiotics for an UTI since Friday. Laying down makes her feel better. No prior vaccination reactions. No fever or night sweats. No known sick contacts. She is scheduled to see her PCP, Dr. Cooper on Monday.    Confirmed that she takes Ativan 1 mg, 3 times per day as needed.  ____________________ Interval history 9/5/23:   She continues to report worsening TN symptoms at night. She would like her medications adjusted to improve her pain. She would like her most recent imaging reviewed from [place] as she was told there were some abnormalities.   _____________________ CC: Trigeminal neuralgia follow up   Interval hx 8/11/23: Since last visit, patient c/o increased pain with her daily Carbamazepine. She has trailed taking a 3rd pill every other day which didn't' show improvement as well as Lacosamide 50mg once daily with no improvement.   ________ had a flare up of her trigeminal pain in the last week. She is very stressed at the situation with her wife and her dementia and her being in the nursing home. She is taking clonazepam bid and that is not helping. in addition she is on tapering doses of Lexapro.  We went up last week to 2 tablets carbamazepine twice a day and it did not work. we then tried three tablets BID and patient felt dizzy and sleepy so she has gone back to 2 twice a day and feels at baseline but is having a lot of pain still.  Pain is worse when she is lying down.

## 2025-03-14 NOTE — HISTORY OF PRESENT ILLNESS
[FreeTextEntry1] : Interval History 3/12/25:  Since last visit, patient reports no new stroke-like symptoms. She is tolerating her Plavix and Zetia without bleeding, bruising or myalgias. She has not yet started Repatha due to pharmacy issues. She continues to report chronic SOB and imbalance issues. She did have a fall last month without head strike or LOC. She is restarting PT after taking a month off. She has been using a walker more often for longer distances. She reports getting a new glasses prescription and has not driven since August. She reports improvement in her mood and fatigue over the past few weeks.   Interval History 11/13/24:  Since last visit, patient no new stroke-like symptoms. She is tolerating her Aspirin, Plavix and Rosuvastatin without bleeding, bruising or myalgias. BP today 118/75, SBPs in 120-140s at home with new blood pressure machine. She reports still not feeling 100% but has loved going to PT twice per week. She still feels wobbly on the left side. She is not taking her Xanax as often. She feels like her mood is stable. She is using Gabapentin 300 mg to help her sleep at night since Trazadone was not beneficial. She denies any TN symptoms. She has been using [facility] to get to her appointments. She reports no recent blood work. She is concerned about hair loss with her cholesterol medication.   Interval History 10/9/24:  Since last visit, patient reports feeling off balance and not walking right and was later admitted to [facility] right thalamic stroke 2/2 [facility]. She was in the hospital for 4 days and then was in rehab for 3 weeks. She has continued to get therapy at home. She also saw a neurosurgeon, Dr. Newberry who recommends a repeat MRI w/wo in 6 months. Discharge medications including DAPT x90 days and Atorvastatin 80 mg. She reports frequent UTIs for which she was given vaginal estrogen cream. She reports no falls and has been using a cane to walk. She has been getting PT at home as well. She feels like her walking and handwriting is better post stroke. She is in the process of applying for [facility] and has been using home health aids to assist with laundry, etc.  Hospital Records: Discharge lab work with LDL 97, PRU 89 and A1c 7.3%. EF 56-60%, LA normal HCT: No visible acute intracranial findings. Small chronic occipital PCA territory infarct. Mild chronic microvascular disease. Possible intraosseous epidermoid or possibly dermoid cyst.  MRI Brain: Acute lacunar infarct in the right thalamus extending into the subthalamic area. Chronic hemorrhagic infarct in the left occipital PCA territory. Chronic lacunar infarct in the left frontal internal watershed involving the corona radiata. Calcific atherosclerotic disease of the proximal intradural left vertebral artery. Punctate chronic hemorrhage in the left dorsal cerebellum.  MRA H/N: There is focal slightly ectatic appearance of the left paraophthalmic ICA, possibly on the basis of a vessel infundibulum or atherosclerotic disease. There is focal severe stenosis of the right PCA P2 segment, presumably atherosclerotic in origin. The proximal anterior, middle and posterior cerebral arteries are otherwise patent bilaterally. No evidence of vascular occlusion, aneurysm or vascular malformation. Carotid US: No carotid stenosis bilaterally. ______________ Interval History 7/24/24:  Xanax this morning, only on Xanax PRN right sided head pain, comes and goes, slowly getting worse for the past 3 weeks, no known triggers, no tenderness to palpation  stopped Gabapentin but still taking Tegretol 400 mg BID trying Trazadone 200 mg daily at bedtime haven't been able to get out of the house for 2 months no SOB when walking to store Tylenol not helpful wants MRI to make sure nothing is wrong legs feel weak and body feels off, everything hurts saw Dr. Cooper (PCP) on Monday, EKG, chest x-ray and blood work negative taking off Atenolol did improve SOB laying in bed all day which makes her feel better ________ Interval History 4/10/24:  Since last visit, she reports that her trigeminal neuralgia is well controlled (Carbamazepine and Gabapentin PRN). She was seen by her psychiatrist yesterday who is transitioning her from Lexapro to Zoloft 25 mg. She took her first dose of Zoloft today and is scheduled to follow up with psych at the beginning of May. She is also slowly being weaned off her Ativan as well. She feels "off" since taking the Zoloft. Her legs continue to feel achey when walking which is consistent since last visit. She also stopped her Atenolol 3 weeks ago as well.   ________ Interval history: Patient's pain is much better with much less pain in the face. Takes carbamazepine standing and gabapentin prn and that seems to work well.  On lexapro 7.5 mg and ativan 1 mg. Still does not feel well.    Interval History 11/8/23: CC: Trigeminal Neuralgia  Since last visit, she continues to endorse similar trigeminal neuralgia pain compared to last visit. The pain is only on the left side and worsens at night. She is currently taking Carbamazepine  mg (2 tablets BID) and Gabapentin 100 mg, 1 tablet in the AM and 2 tablets at bedtime. She took 3 tablets at bedtime once and she felt like it made her too sleepy.  She is also concerned as she has had difficulty walking and breathing since her RSV vaccination 3 weeks ago. The symptoms occurred the day after she got the vaccine. Her primary doctor recommended that she have lab work taken to evaluate her GBS. She felt her symptoms improved last night and into this morning and have since returned. She feels off balance at times and needs to stop and catch her breath when walking. It is difficult for her to breathe deeply. She endorses getting intermittent headaches as well and difficulty with swallowing hot temperature foods. She is currently on antibiotics for an UTI since Friday. Laying down makes her feel better. No prior vaccination reactions. No fever or night sweats. No known sick contacts. She is scheduled to see her PCP, Dr. Cooper on Monday.    Confirmed that she takes Ativan 1 mg, 3 times per day as needed.  ____________________ Interval history 9/5/23:   She continues to report worsening TN symptoms at night. She would like her medications adjusted to improve her pain. She would like her most recent imaging reviewed from [place] as she was told there were some abnormalities.   _____________________ CC: Trigeminal neuralgia follow up   Interval hx 8/11/23: Since last visit, patient c/o increased pain with her daily Carbamazepine. She has trailed taking a 3rd pill every other day which didn't' show improvement as well as Lacosamide 50mg once daily with no improvement.   ________ had a flare up of her trigeminal pain in the last week. She is very stressed at the situation with her wife and her dementia and her being in the nursing home. She is taking clonazepam bid and that is not helping. in addition she is on tapering doses of Lexapro.  We went up last week to 2 tablets carbamazepine twice a day and it did not work. we then tried three tablets BID and patient felt dizzy and sleepy so she has gone back to 2 twice a day and feels at baseline but is having a lot of pain still.  Pain is worse when she is lying down.

## 2025-03-14 NOTE — PHYSICAL EXAM
[FreeTextEntry1] : The patient is alert and oriented x3, naming intact x3, repetition normal, follows three-step commands, and is able to participate fully in the history taking. Speech is normal with no evidence of dysarthria. Memory is intact: Immediate recall 3 out of 3, short-term 3 out of 3, remote memory intact Cranial nerves II through XII intact Motor exam: Upper and lower extremities 5 out of 5 power, normal tone. No abnormal movements noted. Sensory exam: Intact to light touch and pinprick. Romberg negative. Coordination and vestibular exam: Finger to nose intact, no evidence of truncal or appendicular ataxia. No evidence of nystagmus. No vestibular symptoms elicited with head turning during ambulation. Gait: Mildly unsteady gait with cane. No evidence of loss of balance. Reflexes: One to 2+ in upper and lower extremities. No pathological reflexes. Downgoing toes.

## 2025-03-20 NOTE — DISCUSSION/SUMMARY
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

## 2025-03-20 NOTE — HISTORY OF PRESENT ILLNESS
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   ===================== Dec 2024 a1c 6.7% Nov 2024 lipids  HDL 54 LDL 91 T G287; Lp(a) 196.5 nmol/L

## 2025-04-17 NOTE — HISTORY OF PRESENT ILLNESS
[FreeTextEntry1] : here for f/u evaluation and management of DM2 generally feels well and endorses no acute complaints. reports diagnosis ~20+ y/a. no past hospitalizations. no known micro/macrovascular complications. long standing use of januvia. does not recall other meds. reports recent change in appetite, eating more carbs recently. weight gain has occurred. no med changes other than drop on januvia to 50 mg PO QD.  4/2025 : Here for /fu, generally feels well and endorses no acute complaints. no interval hosp.  reports no nausea or vomiting. not on steroids at this time. Has not been monitoring FSG. Today reports full tolerance of GLP-1 agonist at 5 mg dose as prescribed by FABRIZIO GOSS, denies any GI s/e. appetite control  has improved and weight loss has resumed, FSG have improved at home as well, consistently <140, no hypoglycemia.  She otherwise denies any f/c, CP, SOB, palpitations, tremors, depressed mood, anxiety, palpitations, n/v, stool/urinary abn, skin/weight changes, heat/cold intolerance, HAs, breast/nipple changes, polyuria/polydipsia/nocturia or other complaints. Reports recent metformin exposure, not tolerated 2/2 palpitations and heartburn.

## 2025-04-29 NOTE — ASSESSMENT
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 Has been unable to tolerate multiple statins (atorvastatin and rosuvastatin) due to significant myalgias. Significant risk factors and LDL not at goal on ezetimibe, significantly elevated Lpa 196.  #CVA, CAD, HLD: - Labs today - Reviewed risks and benefits of lipid lowering options, patient unable to tolerate statin - Continue Repatha 140 [id number], discussed side effects and method of injection - Continue ezetimibe  - Continue Plavix per Neuro  #DM2: A1c 6.7, continue Mounjaro per Endocrine  #HTN: BP at goal today - Continue amlodipine and losartan

## 2025-04-29 NOTE — REASON FOR VISIT
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  Since stroke, has not been doing much, does not walk or exercise since weather has been cold. Previously able to take roller walker out shopping, no symptoms. Tried rosuvastatin and atorvastatin, had significant bilateral thigh pains that went away with stopping. Currently only on ezetimibe. Has not had much appetite since and she feels in general overwhelmed and tired.   Since her last visit, patient has been feeling better, no new symptoms. Continues to walk using cane without symptoms. Started Repatha 6 weeks ago and tolerating well. Patient had hotdog today prior to blood test.

## 2025-04-29 NOTE — REASON FOR VISIT
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  Since stroke, has not been doing much, does not walk or exercise since weather has been cold. Previously able to take roller walker out shopping, no symptoms. Tried rosuvastatin and atorvastatin, had significant bilateral thigh pains that went away with stopping. Currently only on ezetimibe. Has not had much appetite since and she feels in general overwhelmed and tired.   Since her last visit, patient has been feeling better, no new symptoms. Continues to walk using cane without symptoms. Started Repatha 6 weeks ago and tolerating well. Patient had hotdog today prior to blood test.

## 2025-04-29 NOTE — ASSESSMENT
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 Has been unable to tolerate multiple statins (atorvastatin and rosuvastatin) due to significant myalgias. Significant risk factors and LDL not at goal on ezetimibe, significantly elevated Lpa 196.  #CVA, CAD, HLD: - Labs today - Reviewed risks and benefits of lipid lowering options, patient unable to tolerate statin - Continue Repatha 140 [id number], discussed side effects and method of injection - Continue ezetimibe  - Continue Plavix per Neuro  #DM2: A1c 6.7, continue Mounjaro per Endocrine  #HTN: BP at goal today - Continue amlodipine and losartan

## 2025-07-09 NOTE — ASSESSMENT
[FreeTextEntry1] : Plan: -Continue Plavix, Zetia and Repatha for secondary stroke prevention -Continue yearly f/u with Dr. D'Amico for epidermal cyst surveillance -Continue aggressive vascular risk factor control with PCP, BP goal <130/80 and LDL goal <50 -Continue Sertraline 100 mg for mood management; emphasized increased socialization -Increase Tegretol to 400 mg in AM and 600 mg in the PM every other day for increase trigeminal pain -Start Magnesium Glycinate 500 mg daily at bedtime for sleep assistance and RLS treatment -Continue Gabapentin 300 mg daily at bedtime for sleep assistance; emphasized sleep hygiene practices -Counselled on healthy eating (DASH/Mediterranean diet, limiting red meats and fried foods) -Counselled on importance of regular exercise and remaining active -Counselled on f/u with PCP regarding regular health maintenance and prevention, including routine screening -Counselled on signs of stroke BEFAST and to call 911 with any new or worsening neurological symptoms -RTO in 4 months

## 2025-07-09 NOTE — HISTORY OF PRESENT ILLNESS
[FreeTextEntry1] : Interval History 7/9/25:  Since last visit, patient reports no new stroke-like symptoms. She is tolerating her Plavix, Zetia and Repatha without side effects. BP today 120/80. She stopped PT a few months ago but recently joined for local [employer] for increased socialization. She reports increased stress in the past few months due to her spouse's declining memory and has noticed increased left eye pain, more than half the days of the week. She also continues to report continued insomnia and averages about 4 hours of sleep per night, which has been affected by increased restless leg symptoms. She recently transitioned from Xanax to Clonazepam PRN for anxiety. Lab work from 4/2025 with A1c 6.8% and LDL 50.  Interval History 3/12/25:  Since last visit, patient reports no new stroke-like symptoms. She is tolerating her Plavix and Zetia without bleeding, bruising or myalgias. She has not yet started Repatha due to pharmacy issues. She continues to report chronic SOB and imbalance issues. She did have a fall last month without head strike or LOC. She is restarting PT after taking a month off. She has been using a walker more often for longer distances. She reports getting a new glasses prescription and has not driven since August. She reports improvement in her mood and fatigue over the past few weeks.   Interval History 11/13/24:  Since last visit, patient no new stroke-like symptoms. She is tolerating her Aspirin, Plavix and Rosuvastatin without bleeding, bruising or myalgias. BP today 118/75, SBPs in 120-140s at home with new blood pressure machine. She reports still not feeling 100% but has loved going to PT twice per week. She still feels wobbly on the left side. She is not taking her Xanax as often. She feels like her mood is stable. She is using Gabapentin 300 mg to help her sleep at night since Trazadone was not beneficial. She denies any TN symptoms. She has been using [facility] to get to her appointments. She reports no recent blood work. She is concerned about hair loss with her cholesterol medication.   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 She was in the hospital for 4 days and then was in rehab for 3 weeks. She has continued to get therapy at home. She also saw a neurosurgeon, Dr. Newberry who recommends a repeat MRI w/wo in 6 months. Discharge medications including DAPT x90 days and Atorvastatin 80 mg. She reports frequent UTIs for which she was given vaginal estrogen cream. She reports no falls and has been using a cane to walk. She has been getting PT at home as well. She feels like her walking and handwriting is better post stroke. She is in the process of applying for [facility] and has been using home health aids to assist with laundry, etc.  Hospital Records: Discharge lab work with LDL 97, PRU 89 and A1c 7.3%. EF 56-60%, LA normal HCT: No visible acute intracranial findings. Small chronic occipital PCA territory infarct. Mild chronic microvascular disease. Possible intraosseous epidermoid or possibly dermoid cyst.  MRI Brain: Acute lacunar infarct in the right thalamus extending into the subthalamic area. Chronic hemorrhagic infarct in the left occipital PCA territory. Chronic lacunar infarct in the left frontal internal watershed involving the corona radiata. Calcific atherosclerotic disease of the proximal intradural left vertebral artery. Punctate chronic hemorrhage in the left dorsal cerebellum.  MRA H/N: There is focal slightly ectatic appearance of the left paraophthalmic ICA, possibly on the basis of a vessel infundibulum or atherosclerotic disease. There is focal severe stenosis of the right PCA P2 segment, presumably atherosclerotic in origin. The proximal anterior, middle and posterior cerebral arteries are otherwise patent bilaterally. No evidence of vascular occlusion, aneurysm or vascular malformation. Carotid US: No carotid stenosis bilaterally. ______________ Interval History 7/24/24:  Xanax this morning, only on Xanax PRN right sided head pain, comes and goes, slowly getting worse for the past 3 weeks, no known triggers, no tenderness to palpation  stopped Gabapentin but still taking Tegretol 400 mg BID trying Trazadone 200 mg daily at bedtime haven't been able to get out of the house for 2 months no SOB when walking to store Tylenol not helpful wants MRI to make sure nothing is wrong legs feel weak and body feels off, everything hurts saw Dr. Cooper (PCP) on Monday, EKG, chest x-ray and blood work negative taking off Atenolol did improve SOB laying in bed all day which makes her feel better ________ Interval History 4/10/24:  Since last visit, she reports that her trigeminal neuralgia is well controlled (Carbamazepine and Gabapentin PRN). She was seen by her psychiatrist yesterday who is transitioning her from Lexapro to Zoloft 25 mg. She took her first dose of Zoloft today and is scheduled to follow up with psych at the beginning of May. She is also slowly being weaned off her Ativan as well. She feels "off" since taking the Zoloft. Her legs continue to feel achey when walking which is consistent since last visit. She also stopped her Atenolol 3 weeks ago as well.   ________ Interval history: Patient's pain is much better with much less pain in the face. Takes carbamazepine standing and gabapentin prn and that seems to work well.  On lexapro 7.5 mg and ativan 1 mg. Still does not feel well.    Interval History 11/8/23: CC: Trigeminal Neuralgia  Since last visit, she continues to endorse similar trigeminal neuralgia pain compared to last visit. The pain is only on the left side and worsens at night. She is currently taking Carbamazepine  mg (2 tablets BID) and Gabapentin 100 mg, 1 tablet in the AM and 2 tablets at bedtime. She took 3 tablets at bedtime once and she felt like it made her too sleepy.  She is also concerned as she has had difficulty walking and breathing since her RSV vaccination 3 weeks ago. The symptoms occurred the day after she got the vaccine. Her primary doctor recommended that she have lab work taken to evaluate her GBS. She felt her symptoms improved last night and into this morning and have since returned. She feels off balance at times and needs to stop and catch her breath when walking. It is difficult for her to breathe deeply. She endorses getting intermittent headaches as well and difficulty with swallowing hot temperature foods. She is currently on antibiotics for an UTI since Friday. Laying down makes her feel better. No prior vaccination reactions. No fever or night sweats. No known sick contacts. She is scheduled to see her PCP, Dr. Cooper on Monday.    Confirmed that she takes Ativan 1 mg, 3 times per day as needed.  ____________________ Interval history 9/5/23:   She continues to report worsening TN symptoms at night. She would like her medications adjusted to improve her pain. She would like her most recent imaging reviewed from [facility] as she was told there were some abnormalities.   _____________________ CC: Trigeminal neuralgia follow up   Interval hx 8/11/23: Since last visit, patient c/o increased pain with her daily Carbamazepine. She has trailed taking a 3rd pill every other day which didn't' show improvement as well as Lacosamide 50mg once daily with no improvement.   ________ had a flare up of her trigeminal pain in the last week. She is very stressed at the situation with her wife and her dementia and her being in the nursing home. She is taking clonazepam bid and that is not helping. in addition she is on tapering doses of Lexapro.  We went up last week to 2 tablets carbamazepine twice a day and it did not work. we then tried three tablets BID and patient felt dizzy and sleepy so she has gone back to 2 twice a day and feels at baseline but is having a lot of pain still.  Pain is worse when she is lying down.